# Patient Record
(demographics unavailable — no encounter records)

---

## 2025-03-04 NOTE — PHYSICAL EXAM
[Appropriately responsive] : appropriately responsive [Examination Of The Breasts] : a normal appearance [No Masses] : no breast masses were palpable [Normal] : normal

## 2025-03-11 NOTE — DISCUSSION/SUMMARY
[FreeTextEntry1] : 30 yo presenting for annual counseled on contraception and failure of condoms, not interested in starting contraception at this time pap test performed w/ HPV full STI testing performed  Sherry PGY3 discussed with Dr Bowen

## 2025-03-11 NOTE — HISTORY OF PRESENT ILLNESS
[Patient reported PAP Smear was normal] : Patient reported PAP Smear was normal [Y] : Positive pregnancy history [Normal Amount/Duration] :  normal amount and duration [Currently Active] : currently active [PapSmeardate] : 2/8/2021 [LMPDate] : 2/19/2025 [MensesFreq] : 32-35 [MensesLength] : 7 [PGHxTotal] : 2 [PGHxAbortions] : 1 [[facility] [FreeTextEntry1] : 2/19/2025

## 2025-03-11 NOTE — HISTORY OF PRESENT ILLNESS
[Patient reported PAP Smear was normal] : Patient reported PAP Smear was normal [Y] : Positive pregnancy history [Normal Amount/Duration] :  normal amount and duration [Currently Active] : currently active [PapSmeardate] : 2/8/2021 [LMPDate] : 2/19/2025 [MensesFreq] : 32-35 [MensesLength] : 7 [PGHxTotal] : 2 [PGHxAbortions] : 1 [[facility] [FreeTextEntry1] : 2/19/2025

## 2025-06-25 NOTE — ASSESSMENT
[FreeTextEntry1] : 31 yo female for TEB for back pain.    1)  back pain - likely 2/2 spasm, recommend cyclobenzaprine, ice/heat, gentle movement/mobility.  Consider PT if symptoms do not improve.  2) migraines - chronic, unable to take NSAIDs due to allergy, recommend see neuro for alternatives.

## 2025-06-25 NOTE — HISTORY OF PRESENT ILLNESS
[Home] : at home, [unfilled] , at the time of the visit. [Medical Office: ([facility])___] : at the medical office located in  [Telehealth (audio & video)] : This visit was provided via telehealth using real-time 2-way audio visual technology. [Verbal consent obtained from patient] : the patient, [unfilled] [de-identified] : Discussed with patient: You have chosen to receive care through the use of tele-media. Tele-media enables health care providers at different locations to provide safe, effective, and convenient care through the use of technology. Please note this is a billable encounter. As with any health care service, there are risks associated with the use of tele-media, including equipment failure, poor image and/or resolution, and  issues. You understand that I cannot physically examine you and that you may need to come to the clinic to complete the assessment. Patient agreed verbally to understanding the risks and benefits of tele-media as explained. All questions regarding tele-media encounters were answered.   33 yo female for TEB for back pain.  Reports low back pain for one day, started when lifting 3 year old son/twisting.  Pain is constant. worse with walking, moving. Better with lyind down.  Denies weakness or paresthesias. Denies radiation down leg.  Also states she is unable to take NSAIDs for migraines due to eye swelling reaction.  Would like migraine medication.  Saw neuro years ago but lost to f/u.

## 2025-06-25 NOTE — PHYSICAL EXAM
[No Acute Distress] : no acute distress [No Respiratory Distress] : no respiratory distress  [No Accessory Muscle Use] : no accessory muscle use [Normal Affect] : the affect was normal [Alert and Oriented x3] : oriented to person, place, and time [Normal Insight/Judgement] : insight and judgment were intact